# Patient Record
Sex: MALE | Race: WHITE | NOT HISPANIC OR LATINO | URBAN - METROPOLITAN AREA
[De-identification: names, ages, dates, MRNs, and addresses within clinical notes are randomized per-mention and may not be internally consistent; named-entity substitution may affect disease eponyms.]

---

## 2022-02-22 ENCOUNTER — OUTPATIENT (OUTPATIENT)
Dept: OUTPATIENT SERVICES | Facility: HOSPITAL | Age: 47
LOS: 1 days | End: 2022-02-22
Payer: COMMERCIAL

## 2022-02-22 ENCOUNTER — APPOINTMENT (OUTPATIENT)
Dept: CT IMAGING | Facility: CLINIC | Age: 47
End: 2022-02-22
Payer: COMMERCIAL

## 2022-02-22 DIAGNOSIS — Z00.8 ENCOUNTER FOR OTHER GENERAL EXAMINATION: ICD-10-CM

## 2022-02-22 PROCEDURE — 75574 CT ANGIO HRT W/3D IMAGE: CPT | Mod: 26

## 2022-02-22 PROCEDURE — 82565 ASSAY OF CREATININE: CPT

## 2022-02-22 PROCEDURE — 75574 CT ANGIO HRT W/3D IMAGE: CPT

## 2022-03-08 ENCOUNTER — TRANSCRIPTION ENCOUNTER (OUTPATIENT)
Age: 47
End: 2022-03-08

## 2022-10-17 ENCOUNTER — APPOINTMENT (OUTPATIENT)
Dept: ORTHOPEDIC SURGERY | Facility: CLINIC | Age: 47
End: 2022-10-17
Payer: COMMERCIAL

## 2022-10-17 VITALS — HEIGHT: 71 IN | WEIGHT: 169 LBS | BODY MASS INDEX: 23.66 KG/M2

## 2022-10-17 DIAGNOSIS — Z78.9 OTHER SPECIFIED HEALTH STATUS: ICD-10-CM

## 2022-10-17 PROCEDURE — 72100 X-RAY EXAM L-S SPINE 2/3 VWS: CPT

## 2022-10-17 PROCEDURE — 99204 OFFICE O/P NEW MOD 45 MIN: CPT

## 2022-10-17 PROCEDURE — 72170 X-RAY EXAM OF PELVIS: CPT

## 2022-10-17 RX ORDER — METHYLPREDNISOLONE 4 MG/1
4 TABLET ORAL
Qty: 1 | Refills: 0 | Status: ACTIVE | COMMUNITY
Start: 2022-10-17 | End: 1900-01-01

## 2022-10-17 RX ORDER — CYCLOBENZAPRINE HYDROCHLORIDE 5 MG/1
5 TABLET, FILM COATED ORAL
Qty: 30 | Refills: 0 | Status: ACTIVE | COMMUNITY
Start: 2022-10-17 | End: 1900-01-01

## 2022-10-19 NOTE — DISCUSSION/SUMMARY
[de-identified] : Reviewed Xrays, discussed diagnosis, answered questions\par Reassurance, Cryotherapy, activity mod, rest\par MDP, cyclobenzaprine\par PT\par Fu spine 4-6 weeks

## 2022-10-19 NOTE — HISTORY OF PRESENT ILLNESS
[Lower back] : lower back [7] : 7 [3] : 3 [Dull/Aching] : dull/aching [Radiating] : radiating [Shooting] : shooting [Constant] : constant [Sitting] : sitting [Full time] : Work status: full time [de-identified] : 10/17/2022: 47 year old male here for eval of low back pain since earlier today. Was lifting a box and felt a pop in his back and immediate pain. Pain is stabbing right sided pain and runs down his right leg with associated N/T. No weakness. \par No h/o back pain / HNP. \par PMH: None\par  [] : Post Surgical Visit: no [FreeTextEntry3] : 10/17/22 [FreeTextEntry5] : Patient went to lift a box and felt a pop in his lower back on 10/17/22

## 2022-10-21 ENCOUNTER — APPOINTMENT (OUTPATIENT)
Dept: ORTHOPEDIC SURGERY | Facility: CLINIC | Age: 47
End: 2022-10-21

## 2022-10-21 PROCEDURE — 99214 OFFICE O/P EST MOD 30 MIN: CPT

## 2022-10-21 RX ORDER — NAPROXEN 500 MG/1
500 TABLET ORAL
Qty: 60 | Refills: 1 | Status: ACTIVE | COMMUNITY
Start: 2022-10-21 | End: 1900-01-01

## 2022-10-21 NOTE — IMAGING
[de-identified] : Spine:\par Inspection/Palpation:\par No tenderness to palpation throughout Cervical/thoracic/lumbar spine.\par No bony stepoffs, No lesions.\par \par Gait:\par Non-antalgic, able to perform bilateral toe and heel rise.  Able to perform tandem gait.  \par \par Range of Motion:\par Lumbar Spine: Flexion to 90 degrees, Extension to 30 degrees, rotation 30 degrees bilaterally, lateral flexion to 30 degrees bilaterally.\par \par Neurologic:\par \par Bilateral Lower Extremities 5/5 Iliopsoas/Quadriceps/Hamstrings/ Tibialis Anterior/ Gastrocnemius. Extensor Hallucis Longus/ Flexor Hallucis Longus except \par \par \par Sensation intact to light touch L2-S1\par \par Patellar/ Achilles reflex within normal limits.\par \par \par Negative straight leg raise bilaterally.  hamstring tightness\par \par No pain with IR/ER/Flexion of HIps bilaterally  \par \par .xr ap pelvis reviewed and interpreted independently.  No fractures. Bilateral cam \par X-ray ap/lateral lumbar spine shwos noraml lordotic alignment.  Small L5-S1 retrolisthesis

## 2022-10-21 NOTE — ASSESSMENT
[FreeTextEntry1] : 47 year old male with LBP and RLE radiculopathy\par Limited in bending/lifting/twisting no more than 15 pounds for 3 weeks\par OOW x 3 weeks due to this\par FU in 3 weeks\par PT\par Naprosyn to begin after MDP\par Flexeril PRN \par

## 2022-10-21 NOTE — HISTORY OF PRESENT ILLNESS
[Lower back] : lower back [Gradual] : gradual [Sudden] : sudden [6] : 6 [5] : 5 [Burning] : burning [Shooting] : shooting [Stabbing] : stabbing [Intermittent] : intermittent [Rest] : rest [Exercising] : exercising [Not working due to injury] : Work status: not working due to injury [de-identified] : 47 year old male with 5 days of low  back pain.  Was lifting a large box and felt a pop in his back.  Pain is in low back radiates to left back and down right leg.  Most of time in posterior thigh has gone down to foot.  Went to urgent care on 10/17 received MDP, and flexeril.  He is on day 4 of MDP with some relief.  Pain now 4-5/10 at wrost down from 7-8/10. .  No numbness, weakness or tingling in legs.  No bowel or bladder symtpoms.  No fevers or chills.\par \par  Never had pain like this before. 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

## 2022-11-02 ENCOUNTER — APPOINTMENT (OUTPATIENT)
Dept: ORTHOPEDIC SURGERY | Facility: CLINIC | Age: 47
End: 2022-11-02

## 2022-11-09 ENCOUNTER — APPOINTMENT (OUTPATIENT)
Dept: ORTHOPEDIC SURGERY | Facility: CLINIC | Age: 47
End: 2022-11-09

## 2022-11-09 VITALS — WEIGHT: 169 LBS | HEIGHT: 71 IN | BODY MASS INDEX: 23.66 KG/M2

## 2022-11-09 DIAGNOSIS — M54.16 RADICULOPATHY, LUMBAR REGION: ICD-10-CM

## 2022-11-09 PROCEDURE — 99212 OFFICE O/P EST SF 10 MIN: CPT

## 2022-11-09 NOTE — IMAGING
[de-identified] : Spine:\par Inspection/Palpation:\par No tenderness to palpation throughout Cervical/thoracic/lumbar spine.\par No bony stepoffs, No lesions.\par \par Gait:\par Non-antalgic, able to perform bilateral toe and heel rise.  Able to perform tandem gait.  \par \par Range of Motion:\par Lumbar Spine: Flexion to 90 degrees, Extension to 30 degrees, rotation 30 degrees bilaterally, lateral flexion to 30 degrees bilaterally.\par \par Neurologic:\par \par Bilateral Lower Extremities 5/5 Iliopsoas/Quadriceps/Hamstrings/ Tibialis Anterior/ Gastrocnemius. Extensor Hallucis Longus/ Flexor Hallucis Longus except \par \par \par Sensation intact to light touch L2-S1\par \par Patellar/ Achilles reflex within normal limits.\par \par \par Negative straight leg raise\par \par No pain with IR/ER/Flexion of HIps bilaterally \par \par

## 2022-11-09 NOTE — HISTORY OF PRESENT ILLNESS
[Lower back] : lower back [Gradual] : gradual [Sudden] : sudden [2] : 2 [0] : 0 [Dull/Aching] : dull/aching [Localized] : localized [Intermittent] : intermittent [Rest] : rest [Exercising] : exercising [Not working due to injury] : Work status: not working due to injury [de-identified] : 47 year old male with 5 days of low  back pain.  Was lifting a large box and felt a pop in his back.  Pain is in low back radiates to left back and down right leg.  Most of time in posterior thigh has gone down to foot.  Went to urgent care on 10/17 received MDP, and flexeril.  He is on day 4 of MDP with some relief.  Pain now 4-5/10 at wrost down from 7-8/10. .  No numbness, weakness or tingling in legs.  No bowel or bladder symtpoms.  No fevers or chills.\par \par  Never had pain like this before. \par \par PMH; None\par PSH: none\par All: nkda\par \par 11/09/22: Pt is here for a follow up visit, he reports to be feeling better. \par No lower back pain.  Has some thigh soreness from PT yesterday.  Has been able to perform squats with them.  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

## 2022-11-09 NOTE — ASSESSMENT
[FreeTextEntry1] : 47 year old man with LBP that has mostly resolved.  Plan for RTW on 11/14.  NO restrictions\par FU on as needed basis.